# Patient Record
(demographics unavailable — no encounter records)

---

## 2025-03-24 NOTE — PHYSICAL EXAM
[de-identified] : General appearance: well-nourished and hydrated, pleasant, alert and oriented x 3, cooperative. HEENT: Normocephalic, EOM intact, Nasal septum midline, Oral cavity clear, External auditory canal clear. Cardiovascular: no apparent abnormalities, no lower leg edema, no varicosities, pedal pulses are palpable. Lymphatics Lymph nodes: none palpated, Lymphedema: not present. Neurologic: sensation is normal, no muscle weakness in upper or lower extremities, patella tendon reflexes intact. Dermatologic no apparent skin lesions, moist, warm, no rash. Spine: cervical spine appears normal and moves freely, thoracic spine appears normal and moves freely, lumbosacral spine appears normal and moves freely. Gait: nonantalgic.  Right Knee Inspection: trace effusion. Wounds: none. Alignment: normal alignment Palpation: medial tenderness on palpation. ROM: Active (in degrees): 0-130 with crepitus. Ligamentous laxity (neg): all ligaments appear stable, negative ant. drawer test, negative post. drawer test, stable to varus stress test, stable to valgus stress test, negative Lachman's test, negative pivot shift test, Meniscal Test: negative McMurrays, negative Cosmo. Patellofemoral Alignment Test: Q angle-, normal. Muscle Test: good quad strength. Leg examination: calf is soft and non-tender.	  Left Knee Inspection: trace effusion. Wounds: none. Alignment: normal alignment Palpation: medial tenderness on palpation. ROM: Active (in degrees): 0-130 with crepitus. Ligamentous laxity (neg): all ligaments appear stable, negative ant. drawer test, negative post. drawer test, stable to varus stress test, stable to valgus stress test, negative Lachman's test, negative pivot shift test, Meniscal Test: negative McMurrays, negative Cosmo. Patellofemoral Alignment Test: Q angle-, normal. Muscle Test: good quad strength. Leg examination: calf is soft and non-tender.  [de-identified] : Right knee x-rays, standing AP/Lateral/[place] and [employer]chant films, taken in the office today diffuse tricompartmental degenerative arthritis, medial joint space narrowing, marginal osteophytes, sclerosis, patellofemoral joint space narrowing, peripheral osteophytes, traction spur superior pole of patella bone on bone, Kellgren and Lucio grade 4   Left knee x-rays, standing AP/Lateral/[place] and Merchant films, taken in the office today diffuse tricompartmental degenerative arthritis, medial joint space narrowing, marginal osteophytes, sclerosis, patellofemoral joint space narrowing, peripheral osteophytes, traction spur superior pole of patella bone on bone, Kellgren and Lucio grade 4.

## 2025-03-24 NOTE — DISCUSSION/SUMMARY
[de-identified] : Discussed at length the nature of the patients condition. Their B/L knee symptoms appear secondary to degenerative arthritis. While I believe he would eventually benefit from a staged B/L TKR, he wants to continue with a non-operative course of treatment. Therefore, we will continue nonoperatively. We will schedule for B/L knee viscosupplementation injections. In the interim, for the acute pain patient was given a B/L knee cortisone injection today as detailed above for symptomatic relief. I also suggested he continue with home exercise, weight management, cane for ambulation, and Advil or Tylenol prn.

## 2025-03-24 NOTE — ADDENDUM
[FreeTextEntry1] : This note was written by Billy Holley on 03/24/2025 acting as scribe for Dr. Norm Downing M.D.   I, Dr. Norm Downing, have read and attest that all the information, medical decision making and discharge instructions within are true and accurate.

## 2025-03-24 NOTE — HISTORY OF PRESENT ILLNESS
[de-identified] : CODY HOLCOMB 85 year old male presents for evaluation of B/L knee OA. He had B/L Synvisc in September. pt tries to walk for exercise, uses cane for ambulation and takes Tylenol and Advil prn. He would like to proceed with another round of gel injection but wants Cortisone today since he has some pain.

## 2025-03-24 NOTE — PROCEDURE
[de-identified] : BILATERAL KNEE CORTISONE INJECTION Discussed at length with the patient the planned steroid and lidocaine injection. The risks, benefits, convalescence and alternatives were reviewed. The possible side effects discussed included but were not limited to: pain, swelling, heat and redness. These symptoms are generally mild but if they are extensive then contact the office. Giving pain relievers by mouth such as NSAIDs or Tylenol can generally treat the reactions to steroid and lidocaine. Rare cases of infection have been noted. Rash, hives and itching may occur post injection. If you have muscle pain or cramps, flushing and or swelling of the face, rapid heart beat, nausea, dizziness, fever, chills, headache, difficulty breathing, swelling in the arms or legs, or have a prickly feeling of your skin, contact a health care provider immediately.   Following this discussion, the knee was prepped with betadine and under sterile conditions 5 cc of 2% lidocaine and 1 cc depo-medrol (40mg) were injected with a 21 gauge needle. The needle was introduced into the joint, aspiration was performed to ensure intra-articular placement and the medication was injected. Upon withdrawal of the needle the site was cleaned with alcohol and a bandaid applied. The patient tolerated the injection well and there were no adverse effects. Post injection instructions included no strenuous activity for 24 hours, cryotherapy and if there are any adverse effects to contact the office.

## 2025-04-14 NOTE — PROCEDURE
[de-identified] : Synvisc #1 bilateral knees Discussed at length with the patient the planned Synvisc #1 injection. The risks, benefits, convalescence and alternatives were reviewed. The possible side effects discussed included but were not limited to: pain, swelling, heat and redness. There symptoms are generally mild but if they are extensive then contact the office. Giving pain relievers by mouth such as NSAIDs or Tylenol can generally treat the reactions to Synvisc #1. Rare cases of infection have been noted. Rash, hives and itching may occur post injection. If you have muscle pain or cramps, flushing and or swelling of the face, rapid heart beat, nausea, dizziness, fever, chills, headache, difficulty breathing, swelling in the arms or legs, or have a prickly feeling of your skin, contact a health care provider immediately.  Following this discussion, the knee was prepped with betadine and under sterile condition the Synvisc #1 injection was performed with a 22 gauge needle. The needle was introduced into the joint, aspiration was performed to ensure intra-articular placement and the medication was injected. Upon withdrawal of the needle the site was cleaned with alcohol and a bandaid applied. The patient tolerated the injection well and there were no adverse effects. Post injection instructions included no strenuous activity for 24 hours, cryotherapy and if there are any adverse effects to contact the office.

## 2025-04-21 NOTE — PROCEDURE
[de-identified] : Synvisc #2 Bilateral knees Discussed at length with the patient the planned Synvisc injection. The risks, benefits, convalescence and alternatives were reviewed. The possible side effects discussed included but were not limited to: pain, swelling, heat and redness. There symptoms are generally mild but if they are extensive then contact the office. Giving pain relievers by mouth such as NSAIDs or Tylenol can generally treat the reactions to Synvisc. Rare cases of infection have been noted. Rash, hives and itching may occur post injection. If you have muscle pain or cramps, flushing and or swelling of the face, rapid heart beat, nausea, dizziness, fever, chills, headache, difficulty breathing, swelling in the arms or legs, or have a prickly feeling of your skin, contact a health care provider immediately.  Following this discussion, the knee was prepped with betadine and under sterile condition the Synvisc injection was performed with a 22 gauge needle. The needle was introduced into the joint, aspiration was performed to ensure intra-articular placement and the medication was injected. Upon withdrawal of the needle the site was cleaned with alcohol and a bandaid applied. The patient tolerated the injection well and there were no adverse effects. Post injection instructions included no strenuous activity for 24 hours, cryotherapy and if there are any adverse effects to contact the office.

## 2025-04-28 NOTE — PROCEDURE
[de-identified] : Synvisc  #3 bilateral knee Discussed at length with the patient the planned Synvisc  injection. The risks, benefits, convalescence and alternatives were reviewed. The possible side effects discussed included but were not limited to: pain, swelling, heat and redness. There symptoms are generally mild but if they are extensive then contact the office. Giving pain relievers by mouth such as NSAIDs or Tylenol can generally treat the reactions to Synvisc  . Rare cases of infection have been noted. Rash, hives and itching may occur post injection. If you have muscle pain or cramps, flushing and or swelling of the face, rapid heart beat, nausea, dizziness, fever, chills, headache, difficulty breathing, swelling in the arms or legs, or have a prickly feeling of your skin, contact a health care provider immediately.  Following this discussion, the knee was prepped with betadine and under sterile condition the Synvisc  injection was performed with a 22 gauge needle. The needle was introduced into the joint, aspiration was performed to ensure intra-articular placement and the medication was injected. Upon withdrawal of the needle the site was cleaned with alcohol and a bandaid applied. The patient tolerated the injection well and there were no adverse effects. Post injection instructions included no strenuous activity for 24 hours, cryotherapy and if there are any adverse effects to contact the office.